# Patient Record
Sex: FEMALE | Race: WHITE
[De-identification: names, ages, dates, MRNs, and addresses within clinical notes are randomized per-mention and may not be internally consistent; named-entity substitution may affect disease eponyms.]

---

## 2023-10-09 ENCOUNTER — HOSPITAL ENCOUNTER (INPATIENT)
Dept: HOSPITAL 19 - MEDICAL | Age: 73
LOS: 7 days | Discharge: HOME | DRG: 336 | End: 2023-10-16
Attending: INTERNAL MEDICINE | Admitting: INTERNAL MEDICINE
Payer: OTHER GOVERNMENT

## 2023-10-09 VITALS — SYSTOLIC BLOOD PRESSURE: 165 MMHG | TEMPERATURE: 98.1 F | HEART RATE: 82 BPM | DIASTOLIC BLOOD PRESSURE: 96 MMHG

## 2023-10-09 VITALS — DIASTOLIC BLOOD PRESSURE: 82 MMHG | SYSTOLIC BLOOD PRESSURE: 151 MMHG | TEMPERATURE: 98.4 F | HEART RATE: 87 BPM

## 2023-10-09 VITALS — HEART RATE: 82 BPM | SYSTOLIC BLOOD PRESSURE: 149 MMHG | DIASTOLIC BLOOD PRESSURE: 61 MMHG | TEMPERATURE: 97.6 F

## 2023-10-09 VITALS — BODY MASS INDEX: 20.78 KG/M2 | HEIGHT: 60 IN | WEIGHT: 105.82 LBS

## 2023-10-09 DIAGNOSIS — K66.0: ICD-10-CM

## 2023-10-09 DIAGNOSIS — I10: ICD-10-CM

## 2023-10-09 DIAGNOSIS — E87.6: ICD-10-CM

## 2023-10-09 DIAGNOSIS — E44.0: ICD-10-CM

## 2023-10-09 DIAGNOSIS — J98.11: ICD-10-CM

## 2023-10-09 DIAGNOSIS — K56.609: Primary | ICD-10-CM

## 2023-10-09 PROCEDURE — C9113 INJ PANTOPRAZOLE SODIUM, VIA: HCPCS

## 2023-10-09 PROCEDURE — 0DNW4ZZ RELEASE PERITONEUM, PERCUTANEOUS ENDOSCOPIC APPROACH: ICD-10-PCS | Performed by: SURGERY

## 2023-10-09 PROCEDURE — A9284 NON-ELECTRONIC SPIROMETER: HCPCS

## 2023-10-09 NOTE — NUR
admitted via stretcher from [facility], assisted off cart and into bed at approx
1825, alert and oriented but asks and says things that aren't relevant, into
bathroom and voided qs, then out to room and into gown, heart rate strong and
regular, lungs are CTA, abdomen soft and slightly distended, bowel sounds
present in 4 quads, skin warm and dry and color normal, INT to right
antecubital, she denies needs,  at bedside

## 2023-10-09 NOTE — NUR
PATIENT RECENTLY ARRIVED TO UNIT BEFORE SHIFT CHANGE, ADMISSION COMPLETED.
PATIENT IS A&O. VSS ON TELE. DENIES C/O N/V AT THIS TIME, ONLY C/O ABD PAIN
WITH CERTAIN MOVEMENTS OR WHEN HER ABD IS PUSHED/PALPATED. ABD IS DISTENDED,
SOFT AND WITH POSITIVE BOWL SOUNDS. PATIENT REPORTS SMALL HARD, PEBBLE LIKE
STOOL YESTERDAY. REPORTS SHE IS NOT PASSING FLATUS. CURRENTLY ON BOWL REST,
NPO. GAVE PRN IV ZOFRAN BEFORE SCHEDULED PO MEDS GIVEN WITH SIPS PER
HOSPITALIST, SEE MAR. IV FLUIDS INFUSING VIA PUMP INTO RIGHT AC IV. HEAD TO
TOE ASSESSMENT COMPLETE.  AT BEDSIDE. ORIENTED TO ROOM. CALL LIGHT IN
REACH. HOSPITALIST NP AT BEDSIDE, SEE ORDERS.

## 2023-10-10 VITALS — TEMPERATURE: 98.2 F | HEART RATE: 94 BPM | DIASTOLIC BLOOD PRESSURE: 69 MMHG | SYSTOLIC BLOOD PRESSURE: 155 MMHG

## 2023-10-10 VITALS — HEART RATE: 90 BPM | TEMPERATURE: 98.4 F | SYSTOLIC BLOOD PRESSURE: 140 MMHG | DIASTOLIC BLOOD PRESSURE: 78 MMHG

## 2023-10-10 VITALS — HEART RATE: 104 BPM | SYSTOLIC BLOOD PRESSURE: 159 MMHG | TEMPERATURE: 98.3 F | DIASTOLIC BLOOD PRESSURE: 96 MMHG

## 2023-10-10 VITALS — SYSTOLIC BLOOD PRESSURE: 141 MMHG | TEMPERATURE: 97.5 F | HEART RATE: 77 BPM | DIASTOLIC BLOOD PRESSURE: 81 MMHG

## 2023-10-10 VITALS — SYSTOLIC BLOOD PRESSURE: 153 MMHG

## 2023-10-10 VITALS — SYSTOLIC BLOOD PRESSURE: 155 MMHG

## 2023-10-10 VITALS — TEMPERATURE: 98 F | SYSTOLIC BLOOD PRESSURE: 153 MMHG | HEART RATE: 98 BPM | DIASTOLIC BLOOD PRESSURE: 86 MMHG

## 2023-10-10 VITALS — SYSTOLIC BLOOD PRESSURE: 121 MMHG

## 2023-10-10 VITALS — HEART RATE: 91 BPM | SYSTOLIC BLOOD PRESSURE: 129 MMHG | DIASTOLIC BLOOD PRESSURE: 77 MMHG | TEMPERATURE: 98.8 F

## 2023-10-10 VITALS — SYSTOLIC BLOOD PRESSURE: 151 MMHG

## 2023-10-10 LAB
ANION GAP SERPL CALC-SCNC: 11 MMOL/L (ref 7–16)
BASOPHILS # BLD: 0.1 K/MM3 (ref 0–0.2)
BASOPHILS NFR BLD AUTO: 0.8 % (ref 0–2)
BUN SERPL-MCNC: 13 MG/DL (ref 10–20)
CALCIUM SERPL-MCNC: 8.9 MG/DL (ref 8.4–10.2)
CHLORIDE SERPL-SCNC: 99 MMOL/L (ref 98–107)
CO2 SERPL-SCNC: 26 MMOL/L (ref 23–31)
CREAT SERPL-SCNC: 0.94 MG/DL (ref 0.57–1.11)
EOSINOPHIL # BLD: 0.2 K/MM3 (ref 0–0.7)
EOSINOPHIL NFR BLD: 2.3 % (ref 0–4)
ERYTHROCYTE [DISTWIDTH] IN BLOOD BY AUTOMATED COUNT: 12.8 % (ref 11.5–14.5)
GLUCOSE SERPL-MCNC: 116 MG/DL (ref 70–99)
GRANULOCYTES # BLD AUTO: 73.9 % (ref 42.2–75.2)
HCT VFR BLD AUTO: 34.4 % (ref 37–47)
HGB BLD-MCNC: 11.8 G/DL (ref 12.5–16)
LYMPHOCYTES # BLD: 1.2 K/MM3 (ref 1.2–3.4)
LYMPHOCYTES NFR BLD: 16.2 % (ref 20–51)
MCH RBC QN AUTO: 30 PG (ref 27–31)
MCHC RBC AUTO-ENTMCNC: 34 G/DL (ref 33–37)
MCV RBC AUTO: 87 FL (ref 80–100)
MONOCYTES # BLD: 0.5 K/MM3 (ref 0.1–0.6)
MONOCYTES NFR BLD AUTO: 6.5 % (ref 1.7–9.3)
NEUTROPHILS # BLD: 5.5 K/MM3 (ref 1.4–6.5)
PLATELET # BLD AUTO: 266 K/MM3 (ref 130–400)
PMV BLD AUTO: 9.5 FL (ref 7.4–10.4)
POTASSIUM SERPL-SCNC: 3.7 MMOL/L (ref 3.5–4.5)
RBC # BLD AUTO: 3.96 M/MM3 (ref 4.1–5.3)
SODIUM SERPL-SCNC: 136 MMOL/L (ref 136–145)

## 2023-10-10 NOTE — NUR
Initial shift assessment done- states and abd pain 7/10-did inform pt she
can have some Morphine IV for pain but she prefers to have the Tylenol
suppositiory for pain,, Taking just few sips of clear liquids, Tele on-
NSR, Iv fluids of NS at 75cc/hr-Up to bathroom with assist,voiding clear
yellow urine- states abd feels heavy but denies nausea.

## 2023-10-10 NOTE — NUR
Initial visit:   stopped by room on rounds. Pt was resting and
content.  Pt has no needs right now.   will follow up as needed.

## 2023-10-10 NOTE — NUR
Reviewed student nurses assessment of the patient. Patient A&Ox4. VSS. IV CDI,
fluids infusing. Reports pain in abdomen, patient NPO. Waiting to talk with
the doctor. No further needs expressed. Call light within reach

## 2023-10-10 NOTE — NUR
met with patient to discuss discharge planning. Patient lives in
[place] with her , Addi (ph#[phone]). Patient goes to
[facility] for primary care and medications. Patient does
not use any DME and is independent with ADLS. Patient was not sure if she had
DPOA-HC or not. Patient stated she has two sons: one in [place] and one in
[place]. Patient also has a daughter that lives in [place]. Patient plans to
return to home at time of discharge.
 
Discharge Plan: Home

## 2023-10-11 VITALS — HEART RATE: 81 BPM | DIASTOLIC BLOOD PRESSURE: 74 MMHG | SYSTOLIC BLOOD PRESSURE: 145 MMHG | TEMPERATURE: 97.9 F

## 2023-10-11 VITALS — SYSTOLIC BLOOD PRESSURE: 129 MMHG

## 2023-10-11 VITALS — TEMPERATURE: 98.1 F | SYSTOLIC BLOOD PRESSURE: 133 MMHG | HEART RATE: 86 BPM | DIASTOLIC BLOOD PRESSURE: 83 MMHG

## 2023-10-11 VITALS — DIASTOLIC BLOOD PRESSURE: 87 MMHG | SYSTOLIC BLOOD PRESSURE: 157 MMHG | TEMPERATURE: 98.7 F | HEART RATE: 89 BPM

## 2023-10-11 VITALS — DIASTOLIC BLOOD PRESSURE: 86 MMHG | HEART RATE: 86 BPM | TEMPERATURE: 98 F | SYSTOLIC BLOOD PRESSURE: 135 MMHG

## 2023-10-11 VITALS — HEART RATE: 83 BPM | SYSTOLIC BLOOD PRESSURE: 156 MMHG | DIASTOLIC BLOOD PRESSURE: 81 MMHG | TEMPERATURE: 98.3 F

## 2023-10-11 VITALS — SYSTOLIC BLOOD PRESSURE: 156 MMHG

## 2023-10-11 VITALS — TEMPERATURE: 98.4 F | HEART RATE: 88 BPM | SYSTOLIC BLOOD PRESSURE: 135 MMHG | DIASTOLIC BLOOD PRESSURE: 81 MMHG

## 2023-10-11 VITALS — SYSTOLIC BLOOD PRESSURE: 157 MMHG

## 2023-10-11 VITALS — SYSTOLIC BLOOD PRESSURE: 135 MMHG

## 2023-10-11 LAB
ANION GAP SERPL CALC-SCNC: 14 MMOL/L (ref 7–16)
BASOPHILS # BLD: 0.1 K/MM3 (ref 0–0.2)
BASOPHILS NFR BLD AUTO: 1.2 % (ref 0–2)
BUN SERPL-MCNC: 9 MG/DL (ref 10–20)
CALCIUM SERPL-MCNC: 8.2 MG/DL (ref 8.4–10.2)
CHLORIDE SERPL-SCNC: 101 MMOL/L (ref 98–107)
CO2 SERPL-SCNC: 24 MMOL/L (ref 23–31)
CREAT SERPL-SCNC: 0.78 MG/DL (ref 0.57–1.11)
EOSINOPHIL # BLD: 0.2 K/MM3 (ref 0–0.7)
EOSINOPHIL NFR BLD: 4.6 % (ref 0–4)
ERYTHROCYTE [DISTWIDTH] IN BLOOD BY AUTOMATED COUNT: 12.7 % (ref 11.5–14.5)
GLUCOSE SERPL-MCNC: 79 MG/DL (ref 70–99)
GRANULOCYTES # BLD AUTO: 63.4 % (ref 42.2–75.2)
HCT VFR BLD AUTO: 33.5 % (ref 37–47)
HGB BLD-MCNC: 11.2 G/DL (ref 12.5–16)
LYMPHOCYTES # BLD: 1.2 K/MM3 (ref 1.2–3.4)
LYMPHOCYTES NFR BLD: 24 % (ref 20–51)
MCH RBC QN AUTO: 30 PG (ref 27–31)
MCHC RBC AUTO-ENTMCNC: 33 G/DL (ref 33–37)
MCV RBC AUTO: 89 FL (ref 80–100)
MONOCYTES # BLD: 0.3 K/MM3 (ref 0.1–0.6)
MONOCYTES NFR BLD AUTO: 6.6 % (ref 1.7–9.3)
NEUTROPHILS # BLD: 3.2 K/MM3 (ref 1.4–6.5)
PLATELET # BLD AUTO: 273 K/MM3 (ref 130–400)
PMV BLD AUTO: 9.5 FL (ref 7.4–10.4)
POTASSIUM SERPL-SCNC: 3.1 MMOL/L (ref 3.5–4.5)
RBC # BLD AUTO: 3.75 M/MM3 (ref 4.1–5.3)
SODIUM SERPL-SCNC: 139 MMOL/L (ref 136–145)

## 2023-10-11 NOTE — NUR
Initial shift assessment done- States has a headache and abd pain
6/10,,offered morphine IV but pt prefers just the Tylenol Suppository at this
time, SCD,s placed on pt at this time, IV fluids remain at 75cc/hr, Abd
rounded , soft, distant bowel sounds, is passing some gas - Tele on SR,, VSS,
has a congested sounding cough. Did get a CXR earlier today--Did talk with the
PA on [address]  to look at results-- will order an I.S, no
fever, or increased white count  no o2 needed,so will just watch tonight

## 2023-10-11 NOTE — NUR
VSS, was given Tylenol suppositiory x2 tonight for abd pain- no
nausea/vomiting, states is passing small amount of gas- no BM, abd rounded,
soft w/active bowel sounds.

## 2023-10-12 VITALS — TEMPERATURE: 98.1 F | SYSTOLIC BLOOD PRESSURE: 150 MMHG | DIASTOLIC BLOOD PRESSURE: 85 MMHG | HEART RATE: 81 BPM

## 2023-10-12 VITALS — SYSTOLIC BLOOD PRESSURE: 141 MMHG | DIASTOLIC BLOOD PRESSURE: 71 MMHG | TEMPERATURE: 98.6 F | HEART RATE: 84 BPM

## 2023-10-12 VITALS — DIASTOLIC BLOOD PRESSURE: 72 MMHG | TEMPERATURE: 98 F | SYSTOLIC BLOOD PRESSURE: 157 MMHG | HEART RATE: 83 BPM

## 2023-10-12 VITALS — SYSTOLIC BLOOD PRESSURE: 152 MMHG | DIASTOLIC BLOOD PRESSURE: 93 MMHG | TEMPERATURE: 98.5 F | HEART RATE: 94 BPM

## 2023-10-12 VITALS — TEMPERATURE: 98.4 F | DIASTOLIC BLOOD PRESSURE: 74 MMHG | SYSTOLIC BLOOD PRESSURE: 143 MMHG | HEART RATE: 82 BPM

## 2023-10-12 VITALS — SYSTOLIC BLOOD PRESSURE: 157 MMHG

## 2023-10-12 VITALS — SYSTOLIC BLOOD PRESSURE: 143 MMHG

## 2023-10-12 VITALS — DIASTOLIC BLOOD PRESSURE: 82 MMHG | SYSTOLIC BLOOD PRESSURE: 168 MMHG | TEMPERATURE: 97.5 F | HEART RATE: 86 BPM

## 2023-10-12 VITALS — SYSTOLIC BLOOD PRESSURE: 133 MMHG

## 2023-10-12 VITALS — SYSTOLIC BLOOD PRESSURE: 150 MMHG

## 2023-10-12 VITALS — SYSTOLIC BLOOD PRESSURE: 168 MMHG

## 2023-10-12 LAB
ANION GAP SERPL CALC-SCNC: 17 MMOL/L (ref 7–16)
BASOPHILS # BLD: 0.1 K/MM3 (ref 0–0.2)
BASOPHILS NFR BLD AUTO: 0.9 % (ref 0–2)
BUN SERPL-MCNC: 7 MG/DL (ref 10–20)
CALCIUM SERPL-MCNC: 8.5 MG/DL (ref 8.4–10.2)
CHLORIDE SERPL-SCNC: 97 MMOL/L (ref 98–107)
CO2 SERPL-SCNC: 22 MMOL/L (ref 23–31)
CREAT SERPL-SCNC: 0.71 MG/DL (ref 0.57–1.11)
EOSINOPHIL # BLD: 0.3 K/MM3 (ref 0–0.7)
EOSINOPHIL NFR BLD: 5.4 % (ref 0–4)
ERYTHROCYTE [DISTWIDTH] IN BLOOD BY AUTOMATED COUNT: 12.2 % (ref 11.5–14.5)
GLUCOSE SERPL-MCNC: 88 MG/DL (ref 70–99)
GRANULOCYTES # BLD AUTO: 61 % (ref 42.2–75.2)
HCT VFR BLD AUTO: 34.4 % (ref 37–47)
HGB BLD-MCNC: 11.8 G/DL (ref 12.5–16)
LYMPHOCYTES # BLD: 1.3 K/MM3 (ref 1.2–3.4)
LYMPHOCYTES NFR BLD: 24.3 % (ref 20–51)
MCH RBC QN AUTO: 30 PG (ref 27–31)
MCHC RBC AUTO-ENTMCNC: 34 G/DL (ref 33–37)
MCV RBC AUTO: 87 FL (ref 80–100)
MONOCYTES # BLD: 0.4 K/MM3 (ref 0.1–0.6)
MONOCYTES NFR BLD AUTO: 8.2 % (ref 1.7–9.3)
NEUTROPHILS # BLD: 3.3 K/MM3 (ref 1.4–6.5)
PLATELET # BLD AUTO: 312 K/MM3 (ref 130–400)
PMV BLD AUTO: 9.6 FL (ref 7.4–10.4)
POTASSIUM SERPL-SCNC: 3.2 MMOL/L (ref 3.5–4.5)
RBC # BLD AUTO: 3.95 M/MM3 (ref 4.1–5.3)
SODIUM SERPL-SCNC: 136 MMOL/L (ref 136–145)

## 2023-10-12 NOTE — NUR
PATIENT STILL COMPLAINS OF NAUSEA AFTER ZOFRAN GIVEN, INFORMED ONCALL HOSP
PROVIDER OF C/O, PROVIDER TO ENTER ORDER ACCORDINGLY.

## 2023-10-12 NOTE — NUR
PATIENT REPORTED HAD EMESIS AND THAT HER STOMACH "STILL FEELS FUNNY", OBSERVED
EMESIS AS BROWNISH IN COLOR.

## 2023-10-12 NOTE — NUR
Quiet night , has been resting well, did request cough medicine during the
night and did get order but pt has been sleeping so not given yet, only
received the Tylenol at start of shift- not getting any other meds for
pain/nausea. Only taking a few sips/not wanting to try food {pudding etc"

## 2023-10-12 NOTE — NUR
PATIENT HAS BEEN HAVING POOR APPETITE THROUHOUT THE DAY. PATIENT JUST HAD A
VOMITING EPISODE. SHE REPORTED DID NOT FEEL GOOD AND THEN SHE VOMITTED. VOMIT
WAS GREEN/YELLOW AND RUNNY. PATIENT HELPED GET CLEANED UP AND NEW GOWN WAS
PROVIDED. CALL LIGHT WITHIN REACH. ALARM ON BED.

## 2023-10-12 NOTE — NUR
SHIFT ASSESSMENT COMPLETED. PATIENT WAS HAVING PAIN THIS MORNING. GIVEN
TYLENOL SUPPOSITORY PER EMAR. PATIENT REPORTS CHEST PAIN AND ABDOMIANL PAIN.
SHE REPORTS NOT BEING HUNGRY AND NOT FEELING LIKE EATING HER MEALS. PATIENT
REPORTS HAVING NO BOWEL MOVEMENTS ONLY A PEBBLE OF POOP PER HER WORDS. CALL
LIGHT WITHIN REACH.

## 2023-10-13 VITALS — TEMPERATURE: 98.7 F | DIASTOLIC BLOOD PRESSURE: 79 MMHG | SYSTOLIC BLOOD PRESSURE: 142 MMHG | HEART RATE: 88 BPM

## 2023-10-13 VITALS — SYSTOLIC BLOOD PRESSURE: 142 MMHG | TEMPERATURE: 97.9 F | DIASTOLIC BLOOD PRESSURE: 80 MMHG | HEART RATE: 77 BPM

## 2023-10-13 VITALS — HEART RATE: 96 BPM | DIASTOLIC BLOOD PRESSURE: 92 MMHG | SYSTOLIC BLOOD PRESSURE: 160 MMHG | TEMPERATURE: 98.6 F

## 2023-10-13 VITALS — SYSTOLIC BLOOD PRESSURE: 145 MMHG

## 2023-10-13 VITALS — SYSTOLIC BLOOD PRESSURE: 145 MMHG | TEMPERATURE: 98.4 F | DIASTOLIC BLOOD PRESSURE: 75 MMHG | HEART RATE: 87 BPM

## 2023-10-13 VITALS — SYSTOLIC BLOOD PRESSURE: 142 MMHG

## 2023-10-13 VITALS — TEMPERATURE: 98.1 F | SYSTOLIC BLOOD PRESSURE: 149 MMHG | DIASTOLIC BLOOD PRESSURE: 82 MMHG | HEART RATE: 84 BPM

## 2023-10-13 VITALS — SYSTOLIC BLOOD PRESSURE: 149 MMHG | DIASTOLIC BLOOD PRESSURE: 86 MMHG | TEMPERATURE: 99 F | HEART RATE: 92 BPM

## 2023-10-13 VITALS — SYSTOLIC BLOOD PRESSURE: 160 MMHG

## 2023-10-13 VITALS — SYSTOLIC BLOOD PRESSURE: 149 MMHG

## 2023-10-13 LAB
ANION GAP SERPL CALC-SCNC: 17 MMOL/L (ref 7–16)
BASOPHILS # BLD: 0 K/MM3 (ref 0–0.2)
BASOPHILS NFR BLD AUTO: 0.6 % (ref 0–2)
BUN SERPL-MCNC: 6 MG/DL (ref 10–20)
CALCIUM SERPL-MCNC: 8.4 MG/DL (ref 8.4–10.2)
CHLORIDE SERPL-SCNC: 96 MMOL/L (ref 98–107)
CO2 SERPL-SCNC: 21 MMOL/L (ref 23–31)
CREAT SERPL-SCNC: 0.79 MG/DL (ref 0.57–1.11)
EOSINOPHIL # BLD: 0.1 K/MM3 (ref 0–0.7)
EOSINOPHIL NFR BLD: 2.2 % (ref 0–4)
ERYTHROCYTE [DISTWIDTH] IN BLOOD BY AUTOMATED COUNT: 12.4 % (ref 11.5–14.5)
GLUCOSE SERPL-MCNC: 90 MG/DL (ref 70–99)
GRANULOCYTES # BLD AUTO: 68.1 % (ref 42.2–75.2)
HCT VFR BLD AUTO: 33.8 % (ref 37–47)
HGB BLD-MCNC: 11.2 G/DL (ref 12.5–16)
LYMPHOCYTES # BLD: 1.4 K/MM3 (ref 1.2–3.4)
LYMPHOCYTES NFR BLD: 22 % (ref 20–51)
MCH RBC QN AUTO: 29 PG (ref 27–31)
MCHC RBC AUTO-ENTMCNC: 33 G/DL (ref 33–37)
MCV RBC AUTO: 88 FL (ref 80–100)
MONOCYTES # BLD: 0.4 K/MM3 (ref 0.1–0.6)
MONOCYTES NFR BLD AUTO: 6.9 % (ref 1.7–9.3)
NEUTROPHILS # BLD: 4.3 K/MM3 (ref 1.4–6.5)
PLATELET # BLD AUTO: 308 K/MM3 (ref 130–400)
PMV BLD AUTO: 9.8 FL (ref 7.4–10.4)
POTASSIUM SERPL-SCNC: 3.8 MMOL/L (ref 3.5–4.5)
RBC # BLD AUTO: 3.85 M/MM3 (ref 4.1–5.3)
SODIUM SERPL-SCNC: 134 MMOL/L (ref 136–145)

## 2023-10-13 NOTE — NUR
DR GALARZA SPENT EXTENSIVE TIME WITH PATIENT INFORMING HER OF POSSIBLE SURGERY
FOR TOMORROW. RN SAW PATIENT SHORTLY AFTER, TO SIGN CONSENT, PATIENT DOES NOT
WANT TO SIGN CONSENT AT THIS TIME. PATIENT STATED SHE NEEDS TIME TO THINK
ABOUT IT. PATIENT SAID SHE WANTS TO WAIT AND SEE IF SHE DOES BETTER TONIGHT.
RN EDUCATED HER THAT ALTHOUGH SHE IS UNDERSTANDABLY APREHENSIVE ABOUT FEELING
SICK, SHE SHOULD TRY TO EAT A LITTLE AND SEE HOW SHE FEELS. PATIENT DOES NOT
WISH TO EAT OR DRINK ANYTHING AT THIS TIME.C ALL LIGHT WITHIN REACH. FALL
PRECAUTIONS IN PLACE.

## 2023-10-13 NOTE — NUR
PATIENT  AT BEDSIDE. PATIENT STATED SHE FEELS LIKE FOOD IS COMING BACK
UP HER THROAT AND SHE IS NAUSOUS. PATIENT HAS HAD LITTLE INTAKE. SHE HAS NOT
HAD FOOD FOR 2 HOURS AND ONLY HAD 2 SMALL BITES OF APPLESAUCE AT THAT TIME.
ANTIEMETIC GIVEN.

## 2023-10-13 NOTE — NUR
PATIENT AWAKE AND ALERT, RESTING IN BED. PATIENTS  AT BEDSIDE. FALL
PRECAUTIONS IN PLACE. PATEINT NAUSEA IMPROVED SINCE MEDICATION. PATIENT DOES
NOT WANT ANYTHING TO EAT AT THIS TIME. EDUCATION AGAIN PROVIDED ABOUT INTAKE.
[facility]

## 2023-10-14 VITALS — SYSTOLIC BLOOD PRESSURE: 118 MMHG | TEMPERATURE: 98.1 F | HEART RATE: 86 BPM | DIASTOLIC BLOOD PRESSURE: 67 MMHG

## 2023-10-14 VITALS — TEMPERATURE: 98.2 F | SYSTOLIC BLOOD PRESSURE: 125 MMHG | DIASTOLIC BLOOD PRESSURE: 81 MMHG | HEART RATE: 84 BPM

## 2023-10-14 VITALS — SYSTOLIC BLOOD PRESSURE: 160 MMHG

## 2023-10-14 VITALS — DIASTOLIC BLOOD PRESSURE: 82 MMHG | HEART RATE: 89 BPM | SYSTOLIC BLOOD PRESSURE: 130 MMHG

## 2023-10-14 VITALS — DIASTOLIC BLOOD PRESSURE: 76 MMHG | TEMPERATURE: 97.6 F | HEART RATE: 91 BPM | SYSTOLIC BLOOD PRESSURE: 137 MMHG

## 2023-10-14 VITALS — DIASTOLIC BLOOD PRESSURE: 69 MMHG | HEART RATE: 87 BPM | TEMPERATURE: 98.2 F | SYSTOLIC BLOOD PRESSURE: 126 MMHG

## 2023-10-14 VITALS — HEART RATE: 85 BPM | SYSTOLIC BLOOD PRESSURE: 118 MMHG | DIASTOLIC BLOOD PRESSURE: 68 MMHG | TEMPERATURE: 98.1 F

## 2023-10-14 VITALS — SYSTOLIC BLOOD PRESSURE: 145 MMHG | DIASTOLIC BLOOD PRESSURE: 77 MMHG | HEART RATE: 101 BPM | TEMPERATURE: 98.5 F

## 2023-10-14 VITALS — SYSTOLIC BLOOD PRESSURE: 138 MMHG | TEMPERATURE: 97.9 F | HEART RATE: 94 BPM | DIASTOLIC BLOOD PRESSURE: 71 MMHG

## 2023-10-14 VITALS — SYSTOLIC BLOOD PRESSURE: 145 MMHG

## 2023-10-14 VITALS — TEMPERATURE: 98.3 F | SYSTOLIC BLOOD PRESSURE: 140 MMHG | DIASTOLIC BLOOD PRESSURE: 83 MMHG | HEART RATE: 96 BPM

## 2023-10-14 VITALS — HEART RATE: 87 BPM | DIASTOLIC BLOOD PRESSURE: 71 MMHG | TEMPERATURE: 98.2 F | SYSTOLIC BLOOD PRESSURE: 138 MMHG

## 2023-10-14 VITALS — HEART RATE: 94 BPM | TEMPERATURE: 98.2 F | SYSTOLIC BLOOD PRESSURE: 143 MMHG | DIASTOLIC BLOOD PRESSURE: 83 MMHG

## 2023-10-14 VITALS — SYSTOLIC BLOOD PRESSURE: 138 MMHG

## 2023-10-14 VITALS — SYSTOLIC BLOOD PRESSURE: 129 MMHG | DIASTOLIC BLOOD PRESSURE: 76 MMHG | HEART RATE: 87 BPM

## 2023-10-14 VITALS — SYSTOLIC BLOOD PRESSURE: 140 MMHG

## 2023-10-14 LAB
ANION GAP SERPL CALC-SCNC: 20 MMOL/L (ref 7–16)
BASOPHILS # BLD: 0 K/MM3 (ref 0–0.2)
BASOPHILS NFR BLD AUTO: 0.3 % (ref 0–2)
BUN SERPL-MCNC: 8 MG/DL (ref 10–20)
CALCIUM SERPL-MCNC: 8.1 MG/DL (ref 8.4–10.2)
CHLORIDE SERPL-SCNC: 97 MMOL/L (ref 98–107)
CO2 SERPL-SCNC: 18 MMOL/L (ref 23–31)
CREAT SERPL-SCNC: 0.77 MG/DL (ref 0.57–1.11)
EOSINOPHIL # BLD: 0 K/MM3 (ref 0–0.7)
EOSINOPHIL NFR BLD: 0.2 % (ref 0–4)
ERYTHROCYTE [DISTWIDTH] IN BLOOD BY AUTOMATED COUNT: 12.5 % (ref 11.5–14.5)
GLUCOSE SERPL-MCNC: 118 MG/DL (ref 70–99)
GRANULOCYTES # BLD AUTO: 84.3 % (ref 42.2–75.2)
HCT VFR BLD AUTO: 33.9 % (ref 37–47)
HGB BLD-MCNC: 11.4 G/DL (ref 12.5–16)
LYMPHOCYTES # BLD: 0.7 K/MM3 (ref 1.2–3.4)
LYMPHOCYTES NFR BLD: 10.6 % (ref 20–51)
MCH RBC QN AUTO: 30 PG (ref 27–31)
MCHC RBC AUTO-ENTMCNC: 34 G/DL (ref 33–37)
MCV RBC AUTO: 88 FL (ref 80–100)
MONOCYTES # BLD: 0.3 K/MM3 (ref 0.1–0.6)
MONOCYTES NFR BLD AUTO: 4.1 % (ref 1.7–9.3)
NEUTROPHILS # BLD: 5.4 K/MM3 (ref 1.4–6.5)
PLATELET # BLD AUTO: 301 K/MM3 (ref 130–400)
PMV BLD AUTO: 9.6 FL (ref 7.4–10.4)
POTASSIUM SERPL-SCNC: 3.3 MMOL/L (ref 3.5–4.5)
RBC # BLD AUTO: 3.86 M/MM3 (ref 4.1–5.3)
SODIUM SERPL-SCNC: 135 MMOL/L (ref 136–145)

## 2023-10-14 NOTE — NUR
Shift assessment performed- see documentation. Pt is alert and oriented. She
is complaining of abdominal pain when she is coughing or moving. She currently
has NS running at 75 ml/hr. She had a BP reading of 160/92 and PRN apresoline
was administered. Her home BP meds are administered with the morning meds.
I witnessed pt verbalize understanding for the surgical prodecure scheduled
for tomorrow and she signed the consent. She is NPO at this time. She denies
other needs at this time. Fall precautions and call light within reach.

## 2023-10-14 NOTE — NUR
PATIENT AWAKE AND ALERT, RESTING IN BED. PATIENT DENIES ANY PAIN, NAUSEA OR
VOMITTING AT THIS TIME. VITAL SIGNS STABLE.

## 2023-10-14 NOTE — NUR
PATIENTS SON CALLED RN FOR UPDATE ON PATIENT STATUS. PATIENT GAVE RN VERBALL
CONSENT FOR HER SON TO HAVE ANY AND ALL MEDICAL INFORMATION.

## 2023-10-14 NOTE — NUR
I entered pt's room to find her vomiting at about 0350.  She stated that she
had been coughing which then triggered the nausea/vomiting. PRN zofran was
administered as ordered.

## 2023-10-15 VITALS — DIASTOLIC BLOOD PRESSURE: 83 MMHG | HEART RATE: 84 BPM | SYSTOLIC BLOOD PRESSURE: 148 MMHG | TEMPERATURE: 98.1 F

## 2023-10-15 VITALS — TEMPERATURE: 98.2 F | HEART RATE: 77 BPM | DIASTOLIC BLOOD PRESSURE: 78 MMHG | SYSTOLIC BLOOD PRESSURE: 139 MMHG

## 2023-10-15 VITALS — HEART RATE: 79 BPM | TEMPERATURE: 98.5 F | DIASTOLIC BLOOD PRESSURE: 85 MMHG | SYSTOLIC BLOOD PRESSURE: 130 MMHG

## 2023-10-15 VITALS — SYSTOLIC BLOOD PRESSURE: 147 MMHG

## 2023-10-15 VITALS — SYSTOLIC BLOOD PRESSURE: 127 MMHG

## 2023-10-15 VITALS — TEMPERATURE: 97.7 F | HEART RATE: 83 BPM | DIASTOLIC BLOOD PRESSURE: 80 MMHG | SYSTOLIC BLOOD PRESSURE: 144 MMHG

## 2023-10-15 VITALS — SYSTOLIC BLOOD PRESSURE: 132 MMHG | TEMPERATURE: 97.8 F | DIASTOLIC BLOOD PRESSURE: 77 MMHG | HEART RATE: 78 BPM

## 2023-10-15 VITALS — HEART RATE: 50 BPM | SYSTOLIC BLOOD PRESSURE: 127 MMHG | TEMPERATURE: 98.1 F | DIASTOLIC BLOOD PRESSURE: 64 MMHG

## 2023-10-15 VITALS — SYSTOLIC BLOOD PRESSURE: 139 MMHG

## 2023-10-15 VITALS — SYSTOLIC BLOOD PRESSURE: 147 MMHG | HEART RATE: 97 BPM | DIASTOLIC BLOOD PRESSURE: 81 MMHG | TEMPERATURE: 97.9 F

## 2023-10-15 NOTE — NUR
Initial shift assessment done- denies pain,  was just up to bathroom and
had some gas and a small stool, Tele on SR 81/min, no SOB, VSS, Abd lap sites
clean/dry and intact, IV fluids of NS at 75cc/hr,  has been
eating/drinking some and is tolerating well, denies nausea.  will
probably go home tomorrow.

## 2023-10-15 NOTE — NUR
PATIENT AWAKE AND ALERT, RESTING IN BED. PATIENT DENIES ANY NEEDS OR
COMPLAINTS AT THIS TIME. PATIENTS LAP SITES ARE CDI. PATIENTS CALL LIGHT
WITHIN REACH. FALL PRECAUTIONS IN PLACE.

## 2023-10-15 NOTE — NUR
PATIENT AWAKE AND ALERT, SITTING UP IN BED. PATIENT DENIES ANY PAIN OR
COMPLAINTS AT THIS TIME. FALL PRECAUTION SIN PLACE. CALL LIGHT WITHIN REACH.

## 2023-10-16 VITALS — SYSTOLIC BLOOD PRESSURE: 130 MMHG

## 2023-10-16 VITALS — TEMPERATURE: 97.6 F | HEART RATE: 79 BPM | SYSTOLIC BLOOD PRESSURE: 145 MMHG | DIASTOLIC BLOOD PRESSURE: 85 MMHG

## 2023-10-16 VITALS — HEART RATE: 77 BPM | DIASTOLIC BLOOD PRESSURE: 86 MMHG | TEMPERATURE: 97.8 F | SYSTOLIC BLOOD PRESSURE: 145 MMHG

## 2023-10-16 VITALS — SYSTOLIC BLOOD PRESSURE: 145 MMHG

## 2023-10-16 VITALS — SYSTOLIC BLOOD PRESSURE: 140 MMHG | DIASTOLIC BLOOD PRESSURE: 83 MMHG | TEMPERATURE: 97.4 F | HEART RATE: 81 BPM

## 2023-10-16 VITALS — SYSTOLIC BLOOD PRESSURE: 140 MMHG

## 2023-10-16 LAB
ANION GAP SERPL CALC-SCNC: 10 MMOL/L (ref 7–16)
BASOPHILS # BLD: 0.1 K/MM3 (ref 0–0.2)
BASOPHILS NFR BLD AUTO: 0.9 % (ref 0–2)
BUN SERPL-MCNC: 4 MG/DL (ref 10–20)
CALCIUM SERPL-MCNC: 8.1 MG/DL (ref 8.4–10.2)
CHLORIDE SERPL-SCNC: 104 MMOL/L (ref 98–107)
CO2 SERPL-SCNC: 29 MMOL/L (ref 23–31)
CREAT SERPL-SCNC: 0.7 MG/DL (ref 0.57–1.11)
EOSINOPHIL # BLD: 0.2 K/MM3 (ref 0–0.7)
EOSINOPHIL NFR BLD: 4.1 % (ref 0–4)
ERYTHROCYTE [DISTWIDTH] IN BLOOD BY AUTOMATED COUNT: 12.7 % (ref 11.5–14.5)
GLUCOSE SERPL-MCNC: 92 MG/DL (ref 70–99)
GRANULOCYTES # BLD AUTO: 60.7 % (ref 42.2–75.2)
HCT VFR BLD AUTO: 31.5 % (ref 37–47)
HGB BLD-MCNC: 10.9 G/DL (ref 12.5–16)
LYMPHOCYTES # BLD: 1.6 K/MM3 (ref 1.2–3.4)
LYMPHOCYTES NFR BLD: 27.3 % (ref 20–51)
MAGNESIUM SERPL-MCNC: 1.7 MG/DL (ref 1.6–2.6)
MCH RBC QN AUTO: 30 PG (ref 27–31)
MCHC RBC AUTO-ENTMCNC: 35 G/DL (ref 33–37)
MCV RBC AUTO: 86 FL (ref 80–100)
MONOCYTES # BLD: 0.4 K/MM3 (ref 0.1–0.6)
MONOCYTES NFR BLD AUTO: 6.5 % (ref 1.7–9.3)
NEUTROPHILS # BLD: 3.6 K/MM3 (ref 1.4–6.5)
PLATELET # BLD AUTO: 326 K/MM3 (ref 130–400)
PMV BLD AUTO: 10.1 FL (ref 7.4–10.4)
POTASSIUM SERPL-SCNC: 2.8 MMOL/L (ref 3.5–4.5)
RBC # BLD AUTO: 3.65 M/MM3 (ref 4.1–5.3)
SODIUM SERPL-SCNC: 143 MMOL/L (ref 136–145)

## 2023-10-16 NOTE — NUR
Has not slept much-- did finally get a couple hours of sleep towards morning--
denies pain/nausea-- VSS . Did states she did have a hallucination during the
night of a little boy in her room, states she had told Dr. Bassett about the
hallucinations yesterday- pt states the anesthesia does this to her- bed alarm
on- knows to call for assistance when getting up.

## 2023-10-16 NOTE — NUR
PATIETN AWAKE AND ALERT, SITTING AT EDGE OF BED. PATIENTS  AT BEDSIDE.
PATIENT STATED SHE TOLERATED HER LOW FIBRE BREAKFST VERY WELL, SHE DENIES ANY
PAIN, NEAUSEA OR VOMITTING.

## 2023-10-16 NOTE — NUR
DR GALARZA INFORMED THAT PATIENT TOLERATED LOW FIBER DIET WELL FOR BREAKFAST AND
LUNCH. PER DR GALARZA SHE CAN DISCHARGE AND SEE HIM NEXT WEEK OUTPATIETN

## 2023-10-16 NOTE — NUR
Discharge instructions reviewed with patient and spouse- both verbalize
understanding. INT to RFA d/c'd with cath tip intact. Tele d/c'd. Pt escorted
to private vehicle via w/c and discharge home with spouse.